# Patient Record
Sex: FEMALE | Employment: OTHER | URBAN - NONMETROPOLITAN AREA
[De-identification: names, ages, dates, MRNs, and addresses within clinical notes are randomized per-mention and may not be internally consistent; named-entity substitution may affect disease eponyms.]

---

## 2023-03-29 ENCOUNTER — TELEPHONE (OUTPATIENT)
Dept: SURGERY | Age: 68
End: 2023-03-29

## 2023-03-29 NOTE — TELEPHONE ENCOUNTER
Left voicemail; patient needs scheduled with Dr. Alison Smith for NP referral from [facility] for colonoscopy screening

## 2023-04-04 PROBLEM — E66.812 OBESITY, CLASS II, BMI 35-39.9: Status: ACTIVE | Noted: 2023-03-23

## 2023-04-04 PROBLEM — E66.9 OBESITY, CLASS II, BMI 35-39.9: Status: ACTIVE | Noted: 2023-03-23

## 2023-04-09 PROBLEM — Z86.010 HX OF ADENOMATOUS COLONIC POLYPS: Status: ACTIVE | Noted: 2023-04-09

## 2023-04-09 PROBLEM — Z86.0101 HX OF ADENOMATOUS COLONIC POLYPS: Status: ACTIVE | Noted: 2023-04-09

## 2023-04-24 ENCOUNTER — TELEPHONE (OUTPATIENT)
Dept: SURGERY | Age: 68
End: 2023-04-24

## 2023-05-24 ENCOUNTER — TELEPHONE (OUTPATIENT)
Dept: SURGERY | Age: 68
End: 2023-05-24

## 2023-05-24 NOTE — TELEPHONE ENCOUNTER
Patient canceled her colonoscopy scheduled with Dr. Terrell Souza on 06- due to her  having health issues. She will call back to reschedule. She has been advised depending on how long she waits to reschedule she may need another follow up appointment with Dr. Terrell Souza. Patient voiced understanding.
labor/delivery

## 2023-09-13 ENCOUNTER — CLINICAL DOCUMENTATION (OUTPATIENT)
Dept: SPIRITUAL SERVICES | Facility: CLINIC | Age: 68
End: 2023-09-13

## 2023-09-13 NOTE — ACP (ADVANCE CARE PLANNING)
Advance Care Planning   Advance Care Planning Note  [facility] Care Services    Date:  9/13/2023    Received request from patient. Consultation conversation participants:   Patient who understands ACP conversation  Spouse     Goals of ACP Conversation:  Discuss advance care planning documents  Facilitate a discussion related to patient's goals of care as they align with the patient's values and beliefs. Health Care Decision Makers:      Primary Decision Maker: Jenni Leung - [phone]   Summary:  [facility] [id number] [facility]    Advance Care Planning Documents (Patient Wishes)  Currently on file:   Healthcare Power of /Advance Directive Appointment of [address] [place]  Living Will/Advance Directive    Assessment:    met with Ada De La Rosa, as well as her  Sparkle Stone, to provide support for the completion of Advance Directives documents as part of an [address] [place] [address] conversation. She was alert and oriented with decision-making capacity to express her wishes at this time. Interventions:  Provided education on documents for clarity and greater understanding  Assisted in the completion of documents according to patient's wishes at this time  Encouraged ongoing ACP conversation with future decision makers and loved ones    Care Preferences Communicated:  Ventilation:   If the patient, in their present state of health, suddenly became very ill and unable to breathe on their own,     Patient was unsure of her choice at this time. If their health worsens and it becomes clear that the change of recovery is unlikely,     Patient was unsure of her choice at this time. Resuscitation:  In the event the patient's heart stopped as a result of an underlying serious health condition, the patient communicates a preference for      resuscitative attempts (CPR). Outcomes:  ACP Discussion: Completed  New advance directive completed.   Returned

## 2024-06-26 ENCOUNTER — OFFICE VISIT (OUTPATIENT)
Dept: FAMILY MEDICINE CLINIC | Age: 69
End: 2024-06-26
Payer: MEDICAID

## 2024-06-26 VITALS
HEART RATE: 75 BPM | BODY MASS INDEX: 35.12 KG/M2 | WEIGHT: 198.2 LBS | OXYGEN SATURATION: 97 % | RESPIRATION RATE: 14 BRPM | DIASTOLIC BLOOD PRESSURE: 82 MMHG | SYSTOLIC BLOOD PRESSURE: 118 MMHG | TEMPERATURE: 98.3 F | HEIGHT: 63 IN

## 2024-06-26 DIAGNOSIS — Z78.0 POST-MENOPAUSAL: ICD-10-CM

## 2024-06-26 DIAGNOSIS — Z12.31 ENCOUNTER FOR SCREENING MAMMOGRAM FOR BREAST CANCER: ICD-10-CM

## 2024-06-26 DIAGNOSIS — Z00.00 ANNUAL PHYSICAL EXAM: Primary | ICD-10-CM

## 2024-06-26 DIAGNOSIS — Z13.1 ENCOUNTER FOR SCREENING FOR DIABETES MELLITUS: ICD-10-CM

## 2024-06-26 DIAGNOSIS — Z12.31 BREAST CANCER SCREENING BY MAMMOGRAM: ICD-10-CM

## 2024-06-26 DIAGNOSIS — Z13.0 SCREENING FOR DEFICIENCY ANEMIA: ICD-10-CM

## 2024-06-26 PROCEDURE — 99387 INIT PM E/M NEW PAT 65+ YRS: CPT

## 2024-06-26 SDOH — ECONOMIC STABILITY: FOOD INSECURITY: WITHIN THE PAST 12 MONTHS, THE FOOD YOU BOUGHT JUST DIDN'T LAST AND YOU DIDN'T HAVE MONEY TO GET MORE.: NEVER TRUE

## 2024-06-26 SDOH — ECONOMIC STABILITY: HOUSING INSECURITY
IN THE LAST 12 MONTHS, WAS THERE A TIME WHEN YOU DID NOT HAVE A STEADY PLACE TO SLEEP OR SLEPT IN A SHELTER (INCLUDING NOW)?: NO

## 2024-06-26 SDOH — ECONOMIC STABILITY: FOOD INSECURITY: WITHIN THE PAST 12 MONTHS, YOU WORRIED THAT YOUR FOOD WOULD RUN OUT BEFORE YOU GOT MONEY TO BUY MORE.: NEVER TRUE

## 2024-06-26 SDOH — ECONOMIC STABILITY: INCOME INSECURITY: HOW HARD IS IT FOR YOU TO PAY FOR THE VERY BASICS LIKE FOOD, HOUSING, MEDICAL CARE, AND HEATING?: NOT HARD AT ALL

## 2024-06-26 ASSESSMENT — PATIENT HEALTH QUESTIONNAIRE - PHQ9
SUM OF ALL RESPONSES TO PHQ QUESTIONS 1-9: 0
1. LITTLE INTEREST OR PLEASURE IN DOING THINGS: NOT AT ALL
SUM OF ALL RESPONSES TO PHQ QUESTIONS 1-9: 0
2. FEELING DOWN, DEPRESSED OR HOPELESS: NOT AT ALL
SUM OF ALL RESPONSES TO PHQ9 QUESTIONS 1 & 2: 0
SUM OF ALL RESPONSES TO PHQ QUESTIONS 1-9: 0
SUM OF ALL RESPONSES TO PHQ QUESTIONS 1-9: 0

## 2024-06-26 ASSESSMENT — ENCOUNTER SYMPTOMS
SHORTNESS OF BREATH: 0
NAUSEA: 0
VOMITING: 0

## 2024-06-26 NOTE — PROGRESS NOTES
[facility] PROFESSIONAL [facility]  [address]  [facility] [address]  Dept: [phone]  Dept Fax: [phone]  Loc: [phone]      Laura Higgins is a 69 y.o. female who presents today for:  Chief Complaint   Patient presents with    New Patient     Patient in office today to establish care. No chief complaints for today's visit.        HPI:   Laura Higgins is 69 y.o. presents today to establish care. Has no questions/concerns today.     PMH: denies  Medications: none   Diet: mostly frozen foods, no fruits/vegetables.   Exercise: Currently none but her [employer] has a walking group that gets together twice weekly and is considering starting with them.   Diet: frozen foods.     Objective:     Vitals:    06/26/24 0930   BP: 118/82   Pulse: 75   Resp: 14   Temp: 98.3 °F (36.8 °C)   SpO2: 97%         Wt Readings from Last 3 Encounters:   06/26/24 89.9 kg (198 lb 3.2 oz)   04/10/23 90.7 kg (200 lb)       BP Readings from Last 3 Encounters:   06/26/24 118/82   04/10/23 128/70       No results found for: \"WBC\", \"HGB\", \"HCT\", \"MCV\", \"PLT\"  No results found for: \"NA\", \"K\", \"CL\", \"CO2\", \"BUN\", \"CREATININE\", \"GLUCOSE\", \"CALCIUM\", \"PROT\", \"LABALBU\", \"BILITOT\", \"ALKPHOS\", \"AST\", \"ALT\", \"LABGLOM\", \"GFRAA\", \"AGRATIO\", \"GLOB\"  No results found for: \"TSH\", \"TSHFT4\", \"TSHELE\", \"SNL6AMJ\", \"TSHHS\"  No results found for: \"LABA1C\"  No results found for: \"EAG\"  Lab Results   Component Value Date    CHOL 215 (H) 04/14/2023     Lab Results   Component Value Date    TRIG 129 04/14/2023     Lab Results   Component Value Date    HDL 54 04/14/2023     No components found for: \"LDLCHOLESTEROL\", \"LDLCALC\"  No results found for: \"PSA\"  No results found for: \"QNVBFIGY97\"  No results found for: \"VITD25\"       No results found for: \"BOSE81BZP\"    Review of Systems   Constitutional:  Negative for chills and fever.   Respiratory:  Negative for shortness of breath.    Cardiovascular:

## 2024-06-26 NOTE — PROGRESS NOTES
S: 69 y.o. female with   Chief Complaint   Patient presents with    New Patient     Patient in office today to establish care. No chief complaints for today's visit.        Reviewed hx and ROS in detail with resident prior to exam.  See notes for additional details.     BP Readings from Last 3 Encounters:   06/26/24 118/82   04/10/23 128/70     Wt Readings from Last 3 Encounters:   06/26/24 89.9 kg (198 lb 3.2 oz)   04/10/23 90.7 kg (200 lb)           O: VS:  height is 1.6 m (5' 3\") and weight is 89.9 kg (198 lb 3.2 oz). Her oral temperature is 98.3 °F (36.8 °C). Her blood pressure is 118/82 and her pulse is 75. Her respiration is 14 and oxygen saturation is 97%.        Diagnosis Orders   1. Annual physical exam        2. Breast cancer screening by mammogram        3. Encounter for screening mammogram for breast cancer        4. Encounter for screening for diabetes mellitus        5. Post-menopausal        6. Screening for deficiency anemia        7. BMI 35.0-35.9,adult            Health Maintenance Due   Topic Date Due    COVID-19 Vaccine (1) Never done    Depression Screen  Never done    Diabetes screen  Never done    Breast cancer screen  Never done    Shingles vaccine (1 of 2) Never done    DEXA (modify frequency per FRAX score)  Never done    Respiratory Syncytial Virus (RSV) Pregnant or age 60 yrs+ (1 - 1-dose 60+ series) Never done    Pneumococcal 65+ years Vaccine (1 of 1 - PCV) Never done         Attending Physician Statement  I have discussed the case, including pertinent history and exam findings with the resident. I also have seen the patient and performed key portions of the examination.  I agree with the documented assessment and plan as documented by the resident.  GC modifier added to this encounter      Katelyn Ann Leopold, MD 6/26/2024 10:34 AM

## 2024-06-26 NOTE — PROGRESS NOTES
Pharmacy Medication History Note      List of current medications patient is taking is complete.    Source of information: Patient    Medications removed (include reason, ex. therapy complete or physician discontinued):  None    Medications added/doses adjusted:  None    Other notes (ex. Recent course of antibiotics, Coumadin dosing):  None      Allergies reviewed    Refills requested today:  None    Recommendations:   None     Completed by Paola Mace, LibbyD Candidate.    Amberly Ceballos RPh  6/26/2024 10:45 AM     For Pharmacy Admin Tracking Only    Program: Medical Group  CPA in place:  No  Time Spent (min): 10

## 2024-07-02 ENCOUNTER — HOSPITAL ENCOUNTER (OUTPATIENT)
Dept: WOMENS IMAGING | Age: 69
Discharge: HOME | End: 2024-07-02
Payer: MEDICARE

## 2024-07-02 ENCOUNTER — HOSPITAL ENCOUNTER (OUTPATIENT)
Age: 69
Discharge: HOME | End: 2024-07-02
Payer: MEDICARE

## 2024-07-02 DIAGNOSIS — Z00.00 ANNUAL PHYSICAL EXAM: ICD-10-CM

## 2024-07-02 DIAGNOSIS — Z12.31 ENCOUNTER FOR SCREENING MAMMOGRAM FOR BREAST CANCER: ICD-10-CM

## 2024-07-02 DIAGNOSIS — Z13.0 SCREENING FOR DEFICIENCY ANEMIA: ICD-10-CM

## 2024-07-02 DIAGNOSIS — Z12.31 BREAST CANCER SCREENING BY MAMMOGRAM: ICD-10-CM

## 2024-07-02 DIAGNOSIS — Z78.0 POST-MENOPAUSAL: ICD-10-CM

## 2024-07-02 DIAGNOSIS — Z13.1 ENCOUNTER FOR SCREENING FOR DIABETES MELLITUS: ICD-10-CM

## 2024-07-02 LAB
ALBUMIN SERPL BCG-MCNC: 4.3 G/DL (ref 3.5–5.1)
ALP SERPL-CCNC: 90 U/L (ref 38–126)
ALT SERPL W/O P-5'-P-CCNC: 13 U/L (ref 11–66)
ANION GAP SERPL CALC-SCNC: 11 MEQ/L (ref 8–16)
AST SERPL-CCNC: 18 U/L (ref 5–40)
BASOPHILS ABSOLUTE: 0 THOU/MM3 (ref 0–0.1)
BASOPHILS NFR BLD AUTO: 0.8 %
BILIRUB SERPL-MCNC: 0.4 MG/DL (ref 0.3–1.2)
BUN SERPL-MCNC: 9 MG/DL (ref 7–22)
CALCIUM SERPL-MCNC: 9.1 MG/DL (ref 8.5–10.5)
CHLORIDE SERPL-SCNC: 103 MEQ/L (ref 98–111)
CHOLESTEROL, FASTING: 217 MG/DL (ref 100–199)
CO2 SERPL-SCNC: 25 MEQ/L (ref 23–33)
CREAT SERPL-MCNC: 0.6 MG/DL (ref 0.4–1.2)
DEPRECATED RDW RBC AUTO: 44 FL (ref 35–45)
EOSINOPHIL NFR BLD AUTO: 9.1 %
EOSINOPHILS ABSOLUTE: 0.5 THOU/MM3 (ref 0–0.4)
ERYTHROCYTE [DISTWIDTH] IN BLOOD BY AUTOMATED COUNT: 13.5 % (ref 11.5–14.5)
GFR SERPL CREATININE-BSD FRML MDRD: > 90 ML/MIN/1.73M2
GLUCOSE FASTING: 93 MG/DL (ref 70–108)
GLUCOSE SERPL-MCNC: 93 MG/DL (ref 70–108)
HCT VFR BLD AUTO: 42.4 % (ref 37–47)
HDLC SERPL-MCNC: 47 MG/DL
HGB BLD-MCNC: 13.2 GM/DL (ref 12–16)
IMM GRANULOCYTES # BLD AUTO: 0.02 THOU/MM3 (ref 0–0.07)
IMM GRANULOCYTES NFR BLD AUTO: 0.3 %
LDLC SERPL CALC-MCNC: 149 MG/DL
LYMPHOCYTES ABSOLUTE: 1.4 THOU/MM3 (ref 1–4.8)
LYMPHOCYTES NFR BLD AUTO: 24.4 %
MCH RBC QN AUTO: 27.9 PG (ref 26–33)
MCHC RBC AUTO-ENTMCNC: 31.1 GM/DL (ref 32.2–35.5)
MCV RBC AUTO: 89.6 FL (ref 81–99)
MONOCYTES ABSOLUTE: 0.3 THOU/MM3 (ref 0.4–1.3)
MONOCYTES NFR BLD AUTO: 5.9 %
NEUTROPHILS ABSOLUTE: 3.5 THOU/MM3 (ref 1.8–7.7)
NEUTROPHILS NFR BLD AUTO: 59.5 %
NRBC BLD AUTO-RTO: 0 /100 WBC
PLATELET # BLD AUTO: 301 THOU/MM3 (ref 130–400)
PMV BLD AUTO: 11.2 FL (ref 9.4–12.4)
POTASSIUM SERPL-SCNC: 4 MEQ/L (ref 3.5–5.2)
PROT SERPL-MCNC: 7.3 G/DL (ref 6.1–8)
RBC # BLD AUTO: 4.73 MILL/MM3 (ref 4.2–5.4)
SODIUM SERPL-SCNC: 139 MEQ/L (ref 135–145)
TRIGLYCERIDE, FASTING: 103 MG/DL (ref 0–199)
WBC # BLD AUTO: 5.9 THOU/MM3 (ref 4.8–10.8)

## 2024-07-02 PROCEDURE — 82947 ASSAY GLUCOSE BLOOD QUANT: CPT

## 2024-07-02 PROCEDURE — 77063 BREAST TOMOSYNTHESIS BI: CPT

## 2024-07-02 PROCEDURE — 77080 DXA BONE DENSITY AXIAL: CPT

## 2024-07-02 PROCEDURE — 85025 COMPLETE CBC W/AUTO DIFF WBC: CPT

## 2024-07-02 PROCEDURE — 80053 COMPREHEN METABOLIC PANEL: CPT

## 2024-07-02 PROCEDURE — 36415 COLL VENOUS BLD VENIPUNCTURE: CPT

## 2024-07-02 PROCEDURE — 80061 LIPID PANEL: CPT

## 2024-07-03 ENCOUNTER — HOSPITAL ENCOUNTER (OUTPATIENT)
Dept: WOMENS IMAGING | Age: 69
Discharge: HOME | End: 2024-07-03
Attending: RADIOLOGY

## 2024-07-03 DIAGNOSIS — Z00.6 ENCOUNTER FOR EXAMINATION FOR NORMAL COMPARISON OR CONTROL IN CLINICAL RESEARCH PROGRAM: ICD-10-CM

## 2024-08-06 ENCOUNTER — OFFICE VISIT (OUTPATIENT)
Dept: FAMILY MEDICINE CLINIC | Age: 69
End: 2024-08-06
Payer: MEDICARE

## 2024-08-06 VITALS
TEMPERATURE: 97.7 F | WEIGHT: 195 LBS | DIASTOLIC BLOOD PRESSURE: 84 MMHG | BODY MASS INDEX: 34.55 KG/M2 | HEIGHT: 63 IN | OXYGEN SATURATION: 97 % | SYSTOLIC BLOOD PRESSURE: 126 MMHG | HEART RATE: 75 BPM | RESPIRATION RATE: 12 BRPM

## 2024-08-06 DIAGNOSIS — K21.9 GASTROESOPHAGEAL REFLUX DISEASE WITHOUT ESOPHAGITIS: ICD-10-CM

## 2024-08-06 DIAGNOSIS — N30.00 ACUTE CYSTITIS WITHOUT HEMATURIA: Primary | ICD-10-CM

## 2024-08-06 LAB
BILIRUBIN, POC: NEGATIVE
BLOOD URINE, POC: NEGATIVE
CLARITY, POC: NORMAL
COLOR, POC: NORMAL
GLUCOSE URINE, POC: NEGATIVE
KETONES, POC: NEGATIVE
LEUKOCYTE EST, POC: NORMAL
NITRITE, POC: NEGATIVE
PH, POC: 6
PROTEIN, POC: NEGATIVE
SPECIFIC GRAVITY, POC: >=1.03
UROBILINOGEN, POC: NORMAL

## 2024-08-06 PROCEDURE — 99214 OFFICE O/P EST MOD 30 MIN: CPT

## 2024-08-06 PROCEDURE — 81003 URINALYSIS AUTO W/O SCOPE: CPT

## 2024-08-06 PROCEDURE — 1123F ACP DISCUSS/DSCN MKR DOCD: CPT

## 2024-08-06 RX ORDER — NITROFURANTOIN 25; 75 MG/1; MG/1
100 CAPSULE ORAL 2 TIMES DAILY
Qty: 10 CAPSULE | Refills: 0 | Status: SHIPPED | OUTPATIENT
Start: 2024-08-06 | End: 2024-08-11

## 2024-08-06 ASSESSMENT — ENCOUNTER SYMPTOMS
SHORTNESS OF BREATH: 0
NAUSEA: 1
COUGH: 0
CONSTIPATION: 0
DIARRHEA: 0
VOMITING: 1
ABDOMINAL PAIN: 1

## 2024-08-06 NOTE — PROGRESS NOTES
I saw and evaluated the patient, performing the key elements of the service.  I discussed the findings, assessment and plan with the resident and agree with the resident's findings and plan as documented in the resident's note. GC modifier added.  
y+), Adjuvanted, 0.5mL 10/18/2023    Influenza, FLUARIX, FLULAVAL, FLUZONE (age 6 mo+) AND AFLURIA, (age 3 y+), PF, 0.5mL 11/01/2017, 10/03/2018, 10/04/2019    Influenza, FLUBLOK, (age 18 y+), PF, 0.5mL 09/29/2020, 09/29/2021    Influenza, FLUZONE (age 65 y+), High Dose, 0.7mL 09/30/2022    Pneumococcal, PCV20, PREVNAR 20, (age 6w+), IM, 0.5mL 05/03/2022    RSV, AREXVY, (age 60y+), PF, IM, 0.5mL 09/19/2023    TDaP, ADACEL (age 10y-64y), BOOSTRIX (age 10y+), IM, 0.5mL 09/15/2011, 05/06/2019, 05/03/2022    Zoster Live (Zostavax) 09/27/2017    Zoster Recombinant (Shingrix) 10/24/2019, 07/03/2024     Health Maintenance   Topic Date Due    Annual Wellness Visit (Medicare)  Never done    Flu vaccine (1) 08/01/2024    Depression Screen  06/26/2025    Breast cancer screen  07/02/2026    Lipids  07/02/2029    Colorectal Cancer Screen  08/12/2029    DTaP/Tdap/Td vaccine (4 - Td or Tdap) 05/03/2032    Hepatitis B vaccine  Completed    DEXA (modify frequency per FRAX score)  Completed    Shingles vaccine  Completed    Pneumococcal 65+ years Vaccine  Completed    COVID-19 Vaccine  Completed    Respiratory Syncytial Virus (RSV) Pregnant or age 60 yrs+  Completed    Hepatitis C screen  Completed    Hepatitis A vaccine  Aged Out    Hib vaccine  Aged Out    Polio vaccine  Aged Out    Meningococcal (ACWY) vaccine  Aged Out    Diabetes screen  Discontinued    HIV screen  Discontinued     The 10-year ASCVD risk score (Cory ALFONSO, et al., 2019) is: 7.3%    Values used to calculate the score:      Age: 69 years      Sex: Female      Is Non- : No      Diabetic: No      Tobacco smoker: No      Systolic Blood Pressure: 126 mmHg      Is BP treated: No      HDL Cholesterol: 47 mg/dL      Total Cholesterol: 217 mg/dl        6/26/2024     9:29 AM   PHQ Scores   PHQ2 Score 0   PHQ9 Score 0     Interpretation of Total Score Depression Severity: 1-4 = Minimal depression, 5-9 = Mild depression, 10-14 = Moderate depression,

## 2024-08-08 LAB
BACTERIA UR CULT: ABNORMAL
ORGANISM: ABNORMAL

## 2024-08-11 ENCOUNTER — APPOINTMENT (OUTPATIENT)
Dept: GENERAL RADIOLOGY | Age: 69
End: 2024-08-11
Payer: MEDICARE

## 2024-08-11 ENCOUNTER — HOSPITAL ENCOUNTER (EMERGENCY)
Age: 69
Discharge: HOME | End: 2024-08-12
Attending: STUDENT IN AN ORGANIZED HEALTH CARE EDUCATION/TRAINING PROGRAM
Payer: MEDICARE

## 2024-08-11 ENCOUNTER — APPOINTMENT (OUTPATIENT)
Dept: CT IMAGING | Age: 69
End: 2024-08-11
Payer: MEDICARE

## 2024-08-11 DIAGNOSIS — R74.01 ELEVATED AST (SGOT): ICD-10-CM

## 2024-08-11 DIAGNOSIS — K44.9 HIATAL HERNIA: ICD-10-CM

## 2024-08-11 DIAGNOSIS — R74.01 ELEVATED ALT MEASUREMENT: ICD-10-CM

## 2024-08-11 DIAGNOSIS — R11.2 NAUSEA AND VOMITING, UNSPECIFIED VOMITING TYPE: Primary | ICD-10-CM

## 2024-08-11 LAB
ALBUMIN SERPL BCG-MCNC: 4.4 G/DL (ref 3.5–5.1)
ALP SERPL-CCNC: 163 U/L (ref 38–126)
ALT SERPL W/O P-5'-P-CCNC: 254 U/L (ref 11–66)
ANION GAP SERPL CALC-SCNC: 13 MEQ/L (ref 8–16)
AST SERPL-CCNC: 88 U/L (ref 5–40)
BACTERIA URNS QL MICRO: ABNORMAL /HPF
BASOPHILS ABSOLUTE: 0 THOU/MM3 (ref 0–0.1)
BASOPHILS NFR BLD AUTO: 0.4 %
BILIRUB CONJ SERPL-MCNC: 0.2 MG/DL (ref 0.1–13.8)
BILIRUB SERPL-MCNC: 0.4 MG/DL (ref 0.3–1.2)
BILIRUB UR QL STRIP.AUTO: NEGATIVE
BUN SERPL-MCNC: 11 MG/DL (ref 7–22)
CALCIUM SERPL-MCNC: 9.1 MG/DL (ref 8.5–10.5)
CASTS #/AREA URNS LPF: ABNORMAL /LPF
CASTS 2: ABNORMAL /LPF
CHARACTER UR: CLEAR
CHLORIDE SERPL-SCNC: 102 MEQ/L (ref 98–111)
CO2 SERPL-SCNC: 26 MEQ/L (ref 23–33)
COLOR, UA: YELLOW
CREAT SERPL-MCNC: 0.7 MG/DL (ref 0.4–1.2)
CRYSTALS URNS MICRO: ABNORMAL
DEPRECATED RDW RBC AUTO: 45.6 FL (ref 35–45)
EOSINOPHIL NFR BLD AUTO: 3.7 %
EOSINOPHILS ABSOLUTE: 0.3 THOU/MM3 (ref 0–0.4)
EPITHELIAL CELLS, UA: ABNORMAL /HPF
ERYTHROCYTE [DISTWIDTH] IN BLOOD BY AUTOMATED COUNT: 14.2 % (ref 11.5–14.5)
FLUAV RNA RESP QL NAA+PROBE: NOT DETECTED
FLUBV RNA RESP QL NAA+PROBE: NOT DETECTED
GFR SERPL CREATININE-BSD FRML MDRD: > 90 ML/MIN/1.73M2
GLUCOSE SERPL-MCNC: 126 MG/DL (ref 70–108)
GLUCOSE UR QL STRIP.AUTO: NEGATIVE MG/DL
HCT VFR BLD AUTO: 42.5 % (ref 37–47)
HGB BLD-MCNC: 13.5 GM/DL (ref 12–16)
HGB UR QL STRIP.AUTO: NEGATIVE
IMM GRANULOCYTES # BLD AUTO: 0.04 THOU/MM3 (ref 0–0.07)
IMM GRANULOCYTES NFR BLD AUTO: 0.4 %
KETONES UR QL STRIP.AUTO: 15
LIPASE SERPL-CCNC: 32.9 U/L (ref 5.6–51.3)
LYMPHOCYTES ABSOLUTE: 1.2 THOU/MM3 (ref 1–4.8)
LYMPHOCYTES NFR BLD AUTO: 13.8 %
MAGNESIUM SERPL-MCNC: 2.4 MG/DL (ref 1.6–2.4)
MCH RBC QN AUTO: 28.1 PG (ref 26–33)
MCHC RBC AUTO-ENTMCNC: 31.8 GM/DL (ref 32.2–35.5)
MCV RBC AUTO: 88.5 FL (ref 81–99)
MISCELLANEOUS 2: ABNORMAL
MONOCYTES ABSOLUTE: 0.4 THOU/MM3 (ref 0.4–1.3)
MONOCYTES NFR BLD AUTO: 4.7 %
NEUTROPHILS ABSOLUTE: 6.9 THOU/MM3 (ref 1.8–7.7)
NEUTROPHILS NFR BLD AUTO: 77 %
NITRITE UR QL STRIP: NEGATIVE
NRBC BLD AUTO-RTO: 0 /100 WBC
OSMOLALITY SERPL CALC.SUM OF ELEC: 282.2 MOSMOL/KG (ref 275–300)
PH UR STRIP.AUTO: 5.5 [PH] (ref 5–9)
PLATELET # BLD AUTO: 295 THOU/MM3 (ref 130–400)
PMV BLD AUTO: 10.2 FL (ref 9.4–12.4)
POTASSIUM SERPL-SCNC: 3.8 MEQ/L (ref 3.5–5.2)
PROT SERPL-MCNC: 7.7 G/DL (ref 6.1–8)
PROT UR STRIP.AUTO-MCNC: NEGATIVE MG/DL
RBC # BLD AUTO: 4.8 MILL/MM3 (ref 4.2–5.4)
RBC URINE: ABNORMAL /HPF
RENAL EPI CELLS #/AREA URNS HPF: ABNORMAL /[HPF]
S PYO AG THROAT QL: NEGATIVE
S PYO THROAT QL CULT: NORMAL
SARS-COV-2 RNA RESP QL NAA+PROBE: NOT DETECTED
SODIUM SERPL-SCNC: 141 MEQ/L (ref 135–145)
SP GR UR REFRACT.AUTO: 1.02 (ref 1–1.03)
TROPONIN, HIGH SENSITIVITY: 9 NG/L (ref 0–12)
UROBILINOGEN, URINE: 0.2 EU/DL (ref 0–1)
WBC # BLD AUTO: 9 THOU/MM3 (ref 4.8–10.8)
WBC #/AREA URNS HPF: ABNORMAL /HPF
WBC #/AREA URNS HPF: ABNORMAL /[HPF]
YEAST LIKE FUNGI URNS QL MICRO: ABNORMAL

## 2024-08-11 PROCEDURE — 81001 URINALYSIS AUTO W/SCOPE: CPT

## 2024-08-11 PROCEDURE — 82248 BILIRUBIN DIRECT: CPT

## 2024-08-11 PROCEDURE — 84484 ASSAY OF TROPONIN QUANT: CPT

## 2024-08-11 PROCEDURE — 96374 THER/PROPH/DIAG INJ IV PUSH: CPT

## 2024-08-11 PROCEDURE — 80053 COMPREHEN METABOLIC PANEL: CPT

## 2024-08-11 PROCEDURE — 87880 STREP A ASSAY W/OPTIC: CPT

## 2024-08-11 PROCEDURE — 99285 EMERGENCY DEPT VISIT HI MDM: CPT

## 2024-08-11 PROCEDURE — 6360000002 HC RX W HCPCS

## 2024-08-11 PROCEDURE — 71045 X-RAY EXAM CHEST 1 VIEW: CPT

## 2024-08-11 PROCEDURE — 87070 CULTURE OTHR SPECIMN AEROBIC: CPT

## 2024-08-11 PROCEDURE — 74177 CT ABD & PELVIS W/CONTRAST: CPT

## 2024-08-11 PROCEDURE — 87636 SARSCOV2 & INF A&B AMP PRB: CPT

## 2024-08-11 PROCEDURE — 83735 ASSAY OF MAGNESIUM: CPT

## 2024-08-11 PROCEDURE — 83690 ASSAY OF LIPASE: CPT

## 2024-08-11 PROCEDURE — 96361 HYDRATE IV INFUSION ADD-ON: CPT

## 2024-08-11 PROCEDURE — 93005 ELECTROCARDIOGRAM TRACING: CPT | Performed by: STUDENT IN AN ORGANIZED HEALTH CARE EDUCATION/TRAINING PROGRAM

## 2024-08-11 PROCEDURE — 85025 COMPLETE CBC W/AUTO DIFF WBC: CPT

## 2024-08-11 PROCEDURE — 2580000003 HC RX 258

## 2024-08-11 PROCEDURE — 96375 TX/PRO/DX INJ NEW DRUG ADDON: CPT

## 2024-08-11 PROCEDURE — 36415 COLL VENOUS BLD VENIPUNCTURE: CPT

## 2024-08-11 RX ORDER — 0.9 % SODIUM CHLORIDE 0.9 %
1000 INTRAVENOUS SOLUTION INTRAVENOUS ONCE
Status: COMPLETED | OUTPATIENT
Start: 2024-08-11 | End: 2024-08-12

## 2024-08-11 RX ORDER — ONDANSETRON 2 MG/ML
4 INJECTION INTRAMUSCULAR; INTRAVENOUS ONCE
Status: COMPLETED | OUTPATIENT
Start: 2024-08-11 | End: 2024-08-11

## 2024-08-11 RX ORDER — DROPERIDOL 2.5 MG/ML
1.25 INJECTION, SOLUTION INTRAMUSCULAR; INTRAVENOUS EVERY 6 HOURS PRN
Status: DISCONTINUED | OUTPATIENT
Start: 2024-08-11 | End: 2024-08-12 | Stop reason: HOSPADM

## 2024-08-11 RX ADMIN — ONDANSETRON 4 MG: 2 INJECTION INTRAMUSCULAR; INTRAVENOUS at 22:21

## 2024-08-11 RX ADMIN — DROPERIDOL 1.25 MG: 2.5 INJECTION, SOLUTION INTRAMUSCULAR; INTRAVENOUS at 23:10

## 2024-08-11 RX ADMIN — SODIUM CHLORIDE 1000 ML: 9 INJECTION, SOLUTION INTRAVENOUS at 22:20

## 2024-08-11 ASSESSMENT — PAIN - FUNCTIONAL ASSESSMENT: PAIN_FUNCTIONAL_ASSESSMENT: NONE - DENIES PAIN

## 2024-08-12 ENCOUNTER — TELEPHONE (OUTPATIENT)
Dept: FAMILY MEDICINE CLINIC | Age: 69
End: 2024-08-12

## 2024-08-12 VITALS
TEMPERATURE: 98.6 F | HEART RATE: 74 BPM | SYSTOLIC BLOOD PRESSURE: 137 MMHG | HEIGHT: 64 IN | OXYGEN SATURATION: 97 % | RESPIRATION RATE: 13 BRPM | BODY MASS INDEX: 33.8 KG/M2 | DIASTOLIC BLOOD PRESSURE: 70 MMHG | WEIGHT: 198 LBS

## 2024-08-12 LAB
EKG ATRIAL RATE: 65 BPM
EKG P AXIS: 54 DEGREES
EKG P-R INTERVAL: 162 MS
EKG Q-T INTERVAL: 418 MS
EKG QRS DURATION: 82 MS
EKG QTC CALCULATION (BAZETT): 434 MS
EKG R AXIS: 42 DEGREES
EKG T AXIS: 15 DEGREES
EKG VENTRICULAR RATE: 65 BPM

## 2024-08-12 PROCEDURE — 93010 ELECTROCARDIOGRAM REPORT: CPT | Performed by: INTERNAL MEDICINE

## 2024-08-12 PROCEDURE — 74177 CT ABD & PELVIS W/CONTRAST: CPT

## 2024-08-12 PROCEDURE — 6360000004 HC RX CONTRAST MEDICATION: Performed by: STUDENT IN AN ORGANIZED HEALTH CARE EDUCATION/TRAINING PROGRAM

## 2024-08-12 RX ORDER — ONDANSETRON 4 MG/1
4 TABLET, ORALLY DISINTEGRATING ORAL 3 TIMES DAILY PRN
Qty: 21 TABLET | Refills: 0 | Status: SHIPPED | OUTPATIENT
Start: 2024-08-12

## 2024-08-12 RX ADMIN — IOPAMIDOL 80 ML: 755 INJECTION, SOLUTION INTRAVENOUS at 00:10

## 2024-08-12 NOTE — ED NOTES
Pt medicated per MAR. Pt updated on POC. Pt denies further needs at this time. Vitals collected. Pt breathing easy and unlabored. Call light in reach.

## 2024-08-12 NOTE — ED PROVIDER NOTES
[facility] EMERGENCY DEPT  EMERGENCY DEPARTMENT ENCOUNTER          Pt Name: Laura Higgins  MRN: [id number]  Birthdate 1955  Date of evaluation: 8/11/2024  Physician: Angela Albert MD  Supervising Attending Physician: Hao Ulloa MD       CHIEF COMPLAINT       Chief Complaint   Patient presents with    Emesis         HISTORY OF PRESENT ILLNESS    HPI  Laura Higgins is a 69 y.o. female who presents to the emergency department from home, as a walk in to the ED lobby for evaluation of vomiting    Patient reports that earlier this morning she was feeling okay suddenly afterwards she felt a choking sensation in her throat and shortly after that she has been vomiting.  Patient is unable to keep anything in.  Last bowel movement was today she denies any abdominal surgeries.  She denies any abdominal pain.  The patient has no other acute complaints at this time.      REVIEW OF SYSTEMS   Review of Systems      PAST MEDICAL AND SURGICAL HISTORY   History reviewed. No pertinent past medical history.  Past Surgical History:   Procedure Laterality Date    COLONOSCOPY  2019    Mukesh Flores, DO         MEDICATIONS   No current facility-administered medications for this encounter.    Current Outpatient Medications:     nitrofurantoin, macrocrystal-monohydrate, (MACROBID) 100 MG capsule, Take 1 capsule by mouth 2 times daily for 5 days, Disp: 10 capsule, Rfl: 0    Previous Medications    NITROFURANTOIN, MACROCRYSTAL-MONOHYDRATE, (MACROBID) 100 MG CAPSULE    Take 1 capsule by mouth 2 times daily for 5 days         SOCIAL HISTORY     Social History     Social History Narrative    Not on file     Social History     Tobacco Use    Smoking status: Never    Smokeless tobacco: Never   Vaping Use    Vaping status: Never Used   Substance Use Topics    Alcohol use: Not Currently    Drug use: Never         ALLERGIES   No Known Allergies      FAMILY HISTORY   History reviewed. No pertinent family

## 2024-08-12 NOTE — ED NOTES
Pt resting in bed watching tv w/ family at bedside. Pt updated on POC. Pt denies further needs at this time. Vitals collected. Pt breathing easy and unlabored. Call light in reach.

## 2024-08-12 NOTE — ED TRIAGE NOTES
Pt comes to ED with c/o emesis. Pt reports that emesis began earlier today and she is no longer able to keep fluids down. Pt began taking Macrobid for a UTI on Monday. Pt denies abdominal pain.

## 2024-08-12 NOTE — ED NOTES
Pt ambulated steadily by own power to and from bathroom. Urine sample collected and sent to lab. Pt denies further needs at this time. Vitals collected. Pt breathing easy and unlabored. Call light in reach.

## 2024-08-12 NOTE — DISCHARGE INSTRUCTIONS
Take Zofran as needed for nausea and vomiting.    Follow-up with your primary care physician for further assessment of your elevated liver enzymes.    Return to the emergency department immediately if there is any new or concerning symptom.

## 2024-08-13 LAB — BACTERIA THROAT AEROBE CULT: NORMAL

## 2024-10-16 NOTE — PROGRESS NOTES
Post-Discharge Transitional Care  Follow Up      Laura Higgins   YOB: 1955    Date of Office Visit:  10/17/2024  Date of Hospital Admission: 8/11/24  Date of Hospital Discharge: 8/12/24  Risk of hospital readmission (high >=14%. Medium >=10%) :No data recorded    Care management risk score Rising risk (score 2-5) and Complex Care (Scores >=6): No Risk Score On File     Non face to face  following discharge, date last encounter closed (first attempt may have been earlier): *No documented post hospital discharge outreach found in the last 14 days    Call initiated 2 business days of discharge: *No response recorded in the last 14 days    ASSESSMENT/PLAN:   Hospital discharge follow-up  -     [place] DISCHARGE MEDS RECONCILED W/ CURRENT OUTPATIENT MED LIST  -     [place] DISCHARGE MEDS RECONCILED W/ CURRENT OUTPATIENT MED LIST  Gangrenous cholecystitis    Patient presents to the clinic for transition of care.   Recent admission for gangrenous cholecystitis requiring IV antibiotics and cholecystectomy. Active bowel and urinary movement following surgery with no ongoing sequelae.   Patient tolerated procedure and followed up outpatient with surgery following surgery  Plan to follow up in one month for AWV.    Medical Decision Making: straightforward and low complexity  Return in 1 month (on 11/17/2024).           Subjective:   HPI:  Follow up of Hospital problems/diagnosis(es): Acute gangrenous cholecystitis    Patient presents to the clinic for recent gangrenous cholecystitis requiring IV antibiotics and emergent cholecystectomy  (10/8/24) . Patient tolerated procedure with controlled abdominal pain following surgery. Active bowel and urinary movement. Denies any nausea, fever, chills, vomiting, and diarrhea.    Inpatient course: Discharge summary reviewed- see chart.    Interval history/Current status: Stable    Patient Active Problem List   Diagnosis    Obesity, Class II, BMI 35-39.9    Hx of adenomatous

## 2024-10-17 ENCOUNTER — OFFICE VISIT (OUTPATIENT)
Dept: FAMILY MEDICINE CLINIC | Age: 69
End: 2024-10-17

## 2024-10-17 VITALS
TEMPERATURE: 98.2 F | BODY MASS INDEX: 31.24 KG/M2 | HEIGHT: 64 IN | RESPIRATION RATE: 15 BRPM | WEIGHT: 183 LBS | SYSTOLIC BLOOD PRESSURE: 126 MMHG | HEART RATE: 64 BPM | DIASTOLIC BLOOD PRESSURE: 78 MMHG | OXYGEN SATURATION: 98 %

## 2024-10-17 DIAGNOSIS — Z09 HOSPITAL DISCHARGE FOLLOW-UP: Primary | ICD-10-CM

## 2024-10-17 DIAGNOSIS — K81.0 GANGRENOUS CHOLECYSTITIS: ICD-10-CM

## 2024-10-17 NOTE — PROGRESS NOTES
S: 69 y.o. female with   Chief Complaint   Patient presents with    Follow-Up from Hospital     Pt states she is doing better and is no longer having nausea and vomiting.       HPI: please see resident note for HPI and ROS.    BP Readings from Last 3 Encounters:   10/17/24 126/78   08/12/24 137/70   08/06/24 126/84     Wt Readings from Last 3 Encounters:   10/17/24 83 kg (183 lb)   08/11/24 89.8 kg (198 lb)   08/06/24 88.5 kg (195 lb)       O: VS:  height is 1.626 m (5' 4\") and weight is 83 kg (183 lb). Her oral temperature is 98.2 °F (36.8 °C). Her blood pressure is 126/78 and her pulse is 64. Her respiration is 15 and oxygen saturation is 98%.   AAO/NAD, appropriate affect for mood       Diagnosis Orders   1. Hospital discharge follow-up  AR DISCHARGE MEDS RECONCILED W/ CURRENT OUTPATIENT MED LIST    AR DISCHARGE MEDS RECONCILED W/ CURRENT OUTPATIENT MED LIST      2. Gangrenous cholecystitis            Plan:  Please refer to resident note for full plan.    69-year-old female here for hospital follow up. Hospitalized at outside hospital for acute cholecystitis s/p cholecystectomy on 10/8/2024. Following with general surgery for post-op care. Doing well with no new concerns.     Health Maintenance Due   Topic Date Due    Annual Wellness Visit (Medicare)  Never done    Flu vaccine (1) 08/01/2024    COVID-19 Vaccine (6 - 2023-24 season) 09/01/2024       Attending Physician Statement  I have discussed the case, including pertinent history and exam findings with the resident. I also have seen the patient and performed key portions of the examination.  I agree with the documented assessment and plan as documented by the resident.        Randa Baez, DO 10/31/2024 2:15 PM

## 2024-10-21 PROBLEM — K81.0 GANGRENOUS CHOLECYSTITIS: Status: ACTIVE | Noted: 2024-10-21

## 2024-11-20 ENCOUNTER — OFFICE VISIT (OUTPATIENT)
Dept: FAMILY MEDICINE CLINIC | Age: 69
End: 2024-11-20

## 2024-11-20 VITALS
TEMPERATURE: 97.4 F | OXYGEN SATURATION: 98 % | BODY MASS INDEX: 31.48 KG/M2 | HEART RATE: 70 BPM | HEIGHT: 64 IN | DIASTOLIC BLOOD PRESSURE: 84 MMHG | WEIGHT: 184.4 LBS | RESPIRATION RATE: 16 BRPM | SYSTOLIC BLOOD PRESSURE: 126 MMHG

## 2024-11-20 DIAGNOSIS — E78.00 ELEVATED CHOLESTEROL: ICD-10-CM

## 2024-11-20 DIAGNOSIS — Z28.310 UNVACCINATED FOR COVID-19: ICD-10-CM

## 2024-11-20 DIAGNOSIS — Z00.00 WELCOME TO MEDICARE PREVENTIVE VISIT: Primary | ICD-10-CM

## 2024-11-20 DIAGNOSIS — Z13.29 SCREENING FOR THYROID DISORDER: ICD-10-CM

## 2024-11-20 ASSESSMENT — PATIENT HEALTH QUESTIONNAIRE - PHQ9
SUM OF ALL RESPONSES TO PHQ QUESTIONS 1-9: 0
2. FEELING DOWN, DEPRESSED OR HOPELESS: NOT AT ALL
SUM OF ALL RESPONSES TO PHQ QUESTIONS 1-9: 0
SUM OF ALL RESPONSES TO PHQ QUESTIONS 1-9: 0
1. LITTLE INTEREST OR PLEASURE IN DOING THINGS: NOT AT ALL
SUM OF ALL RESPONSES TO PHQ QUESTIONS 1-9: 0
SUM OF ALL RESPONSES TO PHQ9 QUESTIONS 1 & 2: 0

## 2024-11-20 ASSESSMENT — LIFESTYLE VARIABLES
HOW OFTEN DO YOU HAVE A DRINK CONTAINING ALCOHOL: NEVER
HOW MANY STANDARD DRINKS CONTAINING ALCOHOL DO YOU HAVE ON A TYPICAL DAY: PATIENT DOES NOT DRINK

## 2024-11-20 NOTE — PATIENT INSTRUCTIONS
recommended every 6 months.  Try to get at least 150 minutes of exercise per week or 10,000 steps per day on a pedometer .  Order or download the FREE \"Exercise & Physical Activity: Your Everyday Guide\" from [employer]. Call 1-645.382.9813 or search [employer] online.  You need 6229-1413 mg of calcium and 9035-4096 IU of vitamin D per day. It is possible to meet your calcium requirement with diet alone, but a vitamin D supplement is usually necessary to meet this goal.  When exposed to the sun, use a sunscreen that protects against both UVA and UVB radiation with an SPF of 30 or greater. Reapply every 2 to 3 hours or after sweating, drying off with a towel, or swimming.  Always wear a seat belt when traveling in a car. Always wear a helmet when riding a bicycle or motorcycle.

## 2024-11-20 NOTE — PROGRESS NOTES
Medicare Annual Wellness Visit    Laura Higgins is here for Medicare AWV    Assessment & Plan   Welcome to Medicare preventive visit  -     Lipid, Fasting; Future  -     CBC with Auto Differential; Future  -     Basic Metabolic Panel; Future  -     TSH with Reflex; Future  Elevated cholesterol  -     Lipid, Fasting; Future  BMI 31.0-31.9,adult  -     Lipid, Fasting; Future  -     TSH with Reflex; Future  Screening for thyroid disorder  -     TSH with Reflex; Future  Unvaccinated for covid-19  -     TSH with Reflex; Future    Recommendations for Preventive Services Due: see orders and patient instructions/AVS.  Recommended screening schedule for the next 5-10 years is provided to the patient in written form: see Patient Instructions/AVS.     Return in about 6 months (around 5/20/2025).     Subjective   The following acute and/or chronic problems were also addressed today:  Colonoscopy cancer screening- last colonoscopy in 2019 with polyps w/ Dr. Flores. Getting a repeat done on Monday   Declines COVID and Flu vaccine today     Patient's complete Health Risk Assessment and screening values have been reviewed and are found in Flowsheets. The following problems were reviewed today and where indicated follow up appointments were made and/or referrals ordered.    Positive Risk Factor Screenings with Interventions:              Inactivity:  On average, how many days per week do you engage in moderate to strenuous exercise (like a brisk walk)?: 2 days (!) Abnormal  On average, how many minutes do you engage in exercise at this level?: 40 min     Abnormal BMI (obese):  Body mass index is 31.64 kg/m². (!) Abnormal  Interventions:  Patient comments: walks with a group at her [employer] on M and Th. Stopped drinking soda 1 month ago.         Dentist Screen:  Have you seen the dentist within the past year?: (!) No        Pt does not have teeth since she was 21 years old 2/2 gum disease.         Objective   Vitals:    11/20/24

## 2024-11-20 NOTE — PROGRESS NOTES
Health Maintenance Due   Topic Date Due    Annual Wellness Visit (Medicare)  Never done    Flu vaccine (1) 08/01/2024    COVID-19 Vaccine (10 - 2023-24 season) 09/01/2024